# Patient Record
Sex: FEMALE | Race: WHITE | NOT HISPANIC OR LATINO
[De-identification: names, ages, dates, MRNs, and addresses within clinical notes are randomized per-mention and may not be internally consistent; named-entity substitution may affect disease eponyms.]

---

## 2019-02-25 ENCOUNTER — APPOINTMENT (OUTPATIENT)
Dept: INTERNAL MEDICINE | Facility: CLINIC | Age: 44
End: 2019-02-25
Payer: COMMERCIAL

## 2019-02-25 VITALS
SYSTOLIC BLOOD PRESSURE: 110 MMHG | HEART RATE: 62 BPM | TEMPERATURE: 98.2 F | HEIGHT: 66 IN | OXYGEN SATURATION: 100 % | WEIGHT: 132 LBS | BODY MASS INDEX: 21.21 KG/M2 | DIASTOLIC BLOOD PRESSURE: 60 MMHG

## 2019-02-25 DIAGNOSIS — Z83.79 FAMILY HISTORY OF OTHER DISEASES OF THE DIGESTIVE SYSTEM: ICD-10-CM

## 2019-02-25 DIAGNOSIS — Z80.0 FAMILY HISTORY OF MALIGNANT NEOPLASM OF DIGESTIVE ORGANS: ICD-10-CM

## 2019-02-25 DIAGNOSIS — Z83.3 FAMILY HISTORY OF DIABETES MELLITUS: ICD-10-CM

## 2019-02-25 DIAGNOSIS — F31.9 BIPOLAR DISORDER, UNSPECIFIED: ICD-10-CM

## 2019-02-25 DIAGNOSIS — Z80.1 FAMILY HISTORY OF MALIGNANT NEOPLASM OF TRACHEA, BRONCHUS AND LUNG: ICD-10-CM

## 2019-02-25 PROBLEM — Z00.00 ENCOUNTER FOR PREVENTIVE HEALTH EXAMINATION: Status: ACTIVE | Noted: 2019-02-25

## 2019-02-25 PROCEDURE — 99203 OFFICE O/P NEW LOW 30 MIN: CPT

## 2019-02-25 RX ORDER — OXCARBAZEPINE 600 MG/1
600 TABLET, FILM COATED ORAL TWICE DAILY
Refills: 0 | Status: ACTIVE | COMMUNITY

## 2019-02-25 NOTE — ASSESSMENT
[FreeTextEntry1] : I suspect the current symptoms are due to acid reflux disease. Will increase Prilosec to 20 mg b.i.d. Will refer to gastroenterology. Patient will need a repeat endoscopy.

## 2019-02-25 NOTE — HISTORY OF PRESENT ILLNESS
[FreeTextEntry1] : Burning substernal and upper epigastric pain [de-identified] : Patient complains of a burning substernal and upper and epigastric discomfort over the past 2 weeks. The symptoms have been fairly constant. No nausea or vomiting. She also has slight burning feeling in the throat region. Of note this patient was diagnosed with esophageal ulcer about 2 years ago. She had 2 endoscopies done at that time. She was placed on Nexium which she took for one year. She was doing well until the past 2 weeks when she has recurrent symptoms of a burning discomfort in the substernal and upper and epigastric region. She started taking Prilosec 20 mg daily over the past 10 days but does not feel better.

## 2019-03-08 ENCOUNTER — RECORD ABSTRACTING (OUTPATIENT)
Age: 44
End: 2019-03-08

## 2019-03-08 DIAGNOSIS — Z78.9 OTHER SPECIFIED HEALTH STATUS: ICD-10-CM

## 2019-03-08 DIAGNOSIS — Z87.891 PERSONAL HISTORY OF NICOTINE DEPENDENCE: ICD-10-CM

## 2019-03-10 DIAGNOSIS — F31.70 BIPOLAR DISORDER, CURRENTLY IN REMISSION, MOST RECENT EPISODE UNSPECIFIED: ICD-10-CM

## 2019-03-10 DIAGNOSIS — M50.90 CERVICAL DISC DISORDER, UNSPECIFIED, UNSPECIFIED CERVICAL REGION: ICD-10-CM

## 2019-03-10 NOTE — PHYSICAL EXAM
[General Appearance - Alert] : alert [General Appearance - In No Acute Distress] : in no acute distress [Sclera] : the sclera and conjunctiva were normal [PERRL With Normal Accommodation] : pupils were equal in size, round, and reactive to light [Extraocular Movements] : extraocular movements were intact [Auscultation Breath Sounds / Voice Sounds] : lungs were clear to auscultation bilaterally [Heart Rate And Rhythm] : heart rate was normal and rhythm regular [Heart Sounds] : normal S1 and S2 [Heart Sounds Gallop] : no gallops [Murmurs] : no murmurs [Heart Sounds Pericardial Friction Rub] : no pericardial rub [Edema] : there was no peripheral edema [Flat] : flat [Normal] : normal [Soft, Nontender] : the abdomen was soft and nontender [No Mass] : no masses were palpated [No HSM] : no hepatosplenomegaly noted [Abnormal Walk] : normal gait [Nail Clubbing] : no clubbing  or cyanosis of the fingernails [Musculoskeletal - Swelling] : no joint swelling seen [Skin Color & Pigmentation] : normal skin color and pigmentation [Skin Turgor] : normal skin turgor [] : no rash [Oriented To Time, Place, And Person] : oriented to person, place, and time [Impaired Insight] : insight and judgment were intact [Affect] : the affect was normal

## 2019-03-10 NOTE — ASSESSMENT
[FreeTextEntry1] : \par \par 1. GERD:  well  -  controlled,  no ht burn,  dysphagia,  throat clear\par * No LPR,  No Warren’s w No Dysplasia,  No  Esophagitis grade: A\par * Anti-reflux diet & life-style changes emphasized.  No  Bedge\par * Weight reduction & regular exercise emphasized\par * No  PPI:  ,  No  H2B q HS:  ,  No Carafate  1 gram:\par * No F/U  EGD:  [      ] mo.  /  [      ] yr\par * No  pH Monitor,  No  Manometry,  No  Esophagram\par * No  ENT  eval/F/U,  No  Surgical  eval\par \par \par \par \par 2. Hemorrhoids:  well – controlled.  No pain,  swelling,  itch,  bleeding\par * Discussed the potential complications of thrombosis,  pain,  infection,  swelling, itching,  bleeding \par * High – Fiber Diet was reviewed and emphasized\par * 6  --  8 cups of decaffeinated fluid daily\par * Sitz Bathes BID,  No:  Anusol HC  Suppos / Cream  ND BID\par * No:  Tucks BID,   No:  Balneol Lotion,   No:  Calmoseptine Oint,   ++ Prep H prn\par * No:  Colorectal surgical evaluation for possible ablation \par \par \par \par \par \par Informed Consent:\par * The risks & Benefits of EGD  /  Colonoscopy were discussed w patient.\par * This included but was not limited to perforation, bleeding, sedation /med rxns possibly requiring surgery, blood transfusions, antibiotics & CPR/Intubation.\par * Pt. understands & agrees to the procedures.\par * Pt. advised to D/C  ASA/NSAIDs  7  Days  \par * [ +++ ]  Dulcolax / Miralax / Mag. Citrate ,  [     ] Prepopik/ Clenpiq ,  [     ] Osmo Prep,  [    ] GoLytely,  prep. reviewed w Pt.\par * Hold  [           ] AM of procedure.\par * Hold  [           ] PM of procedure.\par * Take  [           ] PM of procedure.\par * Take  [           ] AM of procedure.\par \par \par \par

## 2019-03-10 NOTE — HISTORY OF PRESENT ILLNESS
[de-identified] : PCP: Chuckie\par \par 42 yo F w h/o  Bipolar D/O, Cervical Disc Dis\par GERD cx by esophageal ulcer,\par Hemorrhoids\par \par Today:\par \par * Abd pain—no\par * Nausea—no\par * Vomit—no\par * Belching—no\par * Regurgitation—no\par * Ht burn—no\par * Throat Clearing—no\par * Globus—no\par * Cough—no\par * Hoarseness—no\par * Dysphagia—no\par * BMs: # 4 qd\par * Constipation—no\par * Diarrhea—no\par * Bloating—no\par * Flatulence—no\par * Gurgling—no\par * Melena—no\par * BPBPR—no\par * Anorexia—no\par * Wt. Loss-no\par \par \par \par

## 2019-03-22 ENCOUNTER — APPOINTMENT (OUTPATIENT)
Dept: GASTROENTEROLOGY | Facility: CLINIC | Age: 44
End: 2019-03-22

## 2019-04-24 ENCOUNTER — APPOINTMENT (OUTPATIENT)
Dept: OBGYN | Facility: CLINIC | Age: 44
End: 2019-04-24
Payer: COMMERCIAL

## 2019-04-24 VITALS
DIASTOLIC BLOOD PRESSURE: 60 MMHG | SYSTOLIC BLOOD PRESSURE: 90 MMHG | HEIGHT: 66 IN | WEIGHT: 134 LBS | BODY MASS INDEX: 21.53 KG/M2

## 2019-04-24 PROCEDURE — 99386 PREV VISIT NEW AGE 40-64: CPT

## 2019-04-25 NOTE — PHYSICAL EXAM
[Awake] : awake [Alert] : alert [Acute Distress] : no acute distress [Mass] : no breast mass [Nipple Discharge] : no nipple discharge [Axillary LAD] : no axillary lymphadenopathy [Soft] : soft [Tender] : non tender [Oriented x3] : oriented to person, place, and time [Normal] : uterus [No Bleeding] : there was no active vaginal bleeding [Uterine Adnexae] : were not tender and not enlarged [de-identified] : no genital warts.

## 2019-06-19 ENCOUNTER — APPOINTMENT (OUTPATIENT)
Dept: INTERNAL MEDICINE | Facility: CLINIC | Age: 44
End: 2019-06-19
Payer: COMMERCIAL

## 2019-06-19 VITALS — HEIGHT: 66 IN | BODY MASS INDEX: 21.53 KG/M2 | TEMPERATURE: 98 F | WEIGHT: 134 LBS

## 2019-06-19 VITALS — DIASTOLIC BLOOD PRESSURE: 60 MMHG | SYSTOLIC BLOOD PRESSURE: 105 MMHG

## 2019-06-19 DIAGNOSIS — H92.01 OTALGIA, RIGHT EAR: ICD-10-CM

## 2019-06-19 PROCEDURE — 99214 OFFICE O/P EST MOD 30 MIN: CPT

## 2019-06-19 NOTE — HISTORY OF PRESENT ILLNESS
[FreeTextEntry1] : right ear pain and dizziness [de-identified] : Patient is a 43F who presents with pain in the right ear in the cold weather on and off. Also with episodes of dizziness on going from a laying down to sitting up position and leaning forward. These episodes are brief. \par Also with post nasal drip. \par Feels right ear feels different from the left. \par Hearing is not impacted

## 2019-06-19 NOTE — HEALTH RISK ASSESSMENT
[No falls in past year] : Patient reported no falls in the past year [0] : 2) Feeling down, depressed, or hopeless: Not at all (0) [] : No [YearQuit] : 1989 [de-identified] : rare [de-identified] : no [de-identified] : healthy [WLQ2Etcdo] : 0

## 2019-06-19 NOTE — REVIEW OF SYSTEMS
[Earache] : earache [Postnasal Drip] : postnasal drip [Negative] : Genitourinary [Hearing Loss] : no hearing loss [Nasal Discharge] : no nasal discharge [Sore Throat] : no sore throat

## 2019-06-19 NOTE — HISTORY OF PRESENT ILLNESS
[FreeTextEntry1] : right ear pain and dizziness [de-identified] : Patient is a 43F who presents with pain in the right ear in the cold weather on and off. Also with episodes of dizziness on going from a laying down to sitting up position and leaning forward. These episodes are brief. \par Also with post nasal drip. \par Feels right ear feels different from the left. \par Hearing is not impacted

## 2019-06-19 NOTE — HEALTH RISK ASSESSMENT
[No falls in past year] : Patient reported no falls in the past year [0] : 2) Feeling down, depressed, or hopeless: Not at all (0) [] : No [de-identified] : rare [YearQuit] : 1989 [de-identified] : no [de-identified] : healthy [[id number]] : 0

## 2019-06-19 NOTE — PHYSICAL EXAM
[No Acute Distress] : no acute distress [Well Nourished] : well nourished [Well Developed] : well developed [No JVD] : no jugular venous distention [Well-Appearing] : well-appearing [No Respiratory Distress] : no respiratory distress  [Clear to Auscultation] : lungs were clear to auscultation bilaterally [Normal Rate] : normal rate  [No Edema] : there was no peripheral edema [Regular Rhythm] : with a regular rhythm [Normal Posterior Cervical Nodes] : no posterior cervical lymphadenopathy [No Joint Swelling] : no joint swelling [Normal Anterior Cervical Nodes] : no anterior cervical lymphadenopathy [No Rash] : no rash [No Focal Deficits] : no focal deficits [Normal Insight/Judgement] : insight and judgment were intact [Normal Affect] : the affect was normal [de-identified] : left ear with impacted cerumen. Nasal turbinates swollen

## 2019-06-19 NOTE — PHYSICAL EXAM
[Well Nourished] : well nourished [No Acute Distress] : no acute distress [Well Developed] : well developed [Well-Appearing] : well-appearing [No JVD] : no jugular venous distention [No Respiratory Distress] : no respiratory distress  [Clear to Auscultation] : lungs were clear to auscultation bilaterally [Normal Rate] : normal rate  [No Edema] : there was no peripheral edema [Regular Rhythm] : with a regular rhythm [Normal Posterior Cervical Nodes] : no posterior cervical lymphadenopathy [Normal Anterior Cervical Nodes] : no anterior cervical lymphadenopathy [No Joint Swelling] : no joint swelling [No Rash] : no rash [No Focal Deficits] : no focal deficits [Normal Insight/Judgement] : insight and judgment were intact [Normal Affect] : the affect was normal [de-identified] : left ear with impacted cerumen. Nasal turbinates swollen

## 2019-06-26 ENCOUNTER — APPOINTMENT (OUTPATIENT)
Dept: INTERNAL MEDICINE | Facility: CLINIC | Age: 44
End: 2019-06-26
Payer: COMMERCIAL

## 2019-06-26 VITALS — SYSTOLIC BLOOD PRESSURE: 110 MMHG | DIASTOLIC BLOOD PRESSURE: 70 MMHG

## 2019-06-26 DIAGNOSIS — H61.22 IMPACTED CERUMEN, LEFT EAR: ICD-10-CM

## 2019-06-26 DIAGNOSIS — R42 DIZZINESS AND GIDDINESS: ICD-10-CM

## 2019-06-26 PROCEDURE — 99213 OFFICE O/P EST LOW 20 MIN: CPT | Mod: 25

## 2019-06-26 PROCEDURE — 69209 REMOVE IMPACTED EAR WAX UNI: CPT

## 2019-06-26 RX ORDER — METRONIDAZOLE 7.5 MG/G
0.75 CREAM TOPICAL
Qty: 45 | Refills: 0 | Status: COMPLETED | COMMUNITY
Start: 2018-09-26 | End: 2019-06-26

## 2019-06-26 RX ORDER — OXCARBAZEPINE 600 MG/1
600 TABLET, FILM COATED ORAL
Qty: 60 | Refills: 0 | Status: DISCONTINUED | COMMUNITY
Start: 2018-03-23 | End: 2019-06-26

## 2019-06-26 NOTE — PHYSICAL EXAM
[No Acute Distress] : no acute distress [Well Developed] : well developed [Well Nourished] : well nourished [Well-Appearing] : well-appearing [Normal Oropharynx] : the oropharynx was normal [Normal Nasal Mucosa] : the nasal mucosa was normal [Normal Outer Ear/Nose] : the outer ears and nose were normal in appearance [No Respiratory Distress] : no respiratory distress  [Normal Posterior Cervical Nodes] : no posterior cervical lymphadenopathy [No Edema] : there was no peripheral edema [Normal Gait] : normal gait [No Rash] : no rash [Normal Anterior Cervical Nodes] : no anterior cervical lymphadenopathy [No Focal Deficits] : no focal deficits [Coordination Grossly Intact] : coordination grossly intact [Normal Affect] : the affect was normal [Normal Insight/Judgement] : insight and judgment were intact [de-identified] : impacted left cerumen pre irrigation, post irrigation with normal TM and erythematous canal on left

## 2019-06-26 NOTE — REVIEW OF SYSTEMS
[Postnasal Drip] : postnasal drip [Earache] : earache [Headache] : no headache [Dizziness] : dizziness [Negative] : Heme/Lymph

## 2019-06-26 NOTE — HISTORY OF PRESENT ILLNESS
[de-identified] : Patient is a 43F who presents today in follow up for impacted cerumen. \par episodes of dizziness on going from a laying down to sitting up position and leaning forward have improved since starting debrox.\par Post nasal drip improving with flonase\par  [FreeTextEntry1] : follow up impacted ear wax in left ear

## 2019-08-06 ENCOUNTER — APPOINTMENT (OUTPATIENT)
Dept: INTERNAL MEDICINE | Facility: CLINIC | Age: 44
End: 2019-08-06
Payer: COMMERCIAL

## 2019-08-06 VITALS — BODY MASS INDEX: 21.69 KG/M2 | WEIGHT: 135 LBS | TEMPERATURE: 98 F | HEIGHT: 66 IN

## 2019-08-06 VITALS — DIASTOLIC BLOOD PRESSURE: 70 MMHG | SYSTOLIC BLOOD PRESSURE: 110 MMHG

## 2019-08-06 DIAGNOSIS — R19.7 DIARRHEA, UNSPECIFIED: ICD-10-CM

## 2019-08-06 PROCEDURE — 99214 OFFICE O/P EST MOD 30 MIN: CPT

## 2019-08-06 RX ORDER — CLINDAMYCIN PHOSPHATE 100 MG/1
100 SUPPOSITORY VAGINAL
Qty: 3 | Refills: 0 | Status: DISCONTINUED | COMMUNITY
Start: 2019-05-15

## 2019-08-06 RX ORDER — FLUCONAZOLE 150 MG/1
150 TABLET ORAL
Qty: 10 | Refills: 0 | Status: DISCONTINUED | COMMUNITY
Start: 2019-05-15

## 2019-08-06 NOTE — HISTORY OF PRESENT ILLNESS
[FreeTextEntry1] : diarrhea, abnormal thyroid tests,  [de-identified] : Patient is a 43F with a hx of GERD and ulcer follows with Dr. Tavera - has had bouts of diarrhea in the past of no known etiology. They usually resolve within a few days. Currently patient is having diarrhea for the last 11 days 3-4 times per day and increased after eating. Abdominal pain in the lower abdomen and RUQ that is relieved with defecation\par nausea and heartburn present, No fever\par no antibiotics recently, no recent travel\par \par Patient would like to follow up on low eosinophil level from back in April - reports Dr. Tavera was supposed to get back to her after peaking to hematology, has not been able to get in touch with Dr. Tavera\marcus Also was noted in april to have abnormal thyroid labs

## 2019-08-06 NOTE — HEALTH RISK ASSESSMENT
[No] : In the past 12 months have you used drugs other than those required for medical reasons? No [No falls in past year] : Patient reported no falls in the past year [0] : 2) Feeling down, depressed, or hopeless: Not at all (0) [] : No [de-identified] : well balanced  [de-identified] : 3 days week, walks/run [YearQuit] : 2000 [[id number]] : 0

## 2019-08-06 NOTE — REVIEW OF SYSTEMS
[Fatigue] : fatigue [Abdominal Pain] : abdominal pain [Nausea] : nausea [Diarrhea] : diarrhea [Heartburn] : heartburn [Negative] : Heme/Lymph [Fever] : no fever [Chills] : no chills [Vomiting] : no vomiting

## 2019-08-06 NOTE — PHYSICAL EXAM
[Normal Sclera/Conjunctiva] : normal sclera/conjunctiva [Normal Outer Ear/Nose] : the outer ears and nose were normal in appearance [No Edema] : there was no peripheral edema [Normal] : affect was normal and insight and judgment were intact

## 2019-08-09 ENCOUNTER — RX RENEWAL (OUTPATIENT)
Age: 44
End: 2019-08-09

## 2019-08-15 ENCOUNTER — RX RENEWAL (OUTPATIENT)
Age: 44
End: 2019-08-15

## 2019-09-16 ENCOUNTER — APPOINTMENT (OUTPATIENT)
Dept: INTERNAL MEDICINE | Facility: CLINIC | Age: 44
End: 2019-09-16
Payer: COMMERCIAL

## 2019-09-16 VITALS — DIASTOLIC BLOOD PRESSURE: 70 MMHG | SYSTOLIC BLOOD PRESSURE: 100 MMHG

## 2019-09-16 VITALS — BODY MASS INDEX: 21.69 KG/M2 | WEIGHT: 135 LBS | HEIGHT: 66 IN | TEMPERATURE: 98 F

## 2019-09-16 DIAGNOSIS — D72.818 OTHER DECREASED WHITE BLOOD CELL COUNT: ICD-10-CM

## 2019-09-16 DIAGNOSIS — A04.72 ENTEROCOLITIS DUE TO CLOSTRIDIUM DIFFICILE, NOT SPECIFIED AS RECURRENT: ICD-10-CM

## 2019-09-16 DIAGNOSIS — R94.6 ABNORMAL RESULTS OF THYROID FUNCTION STUDIES: ICD-10-CM

## 2019-09-16 DIAGNOSIS — R09.81 NASAL CONGESTION: ICD-10-CM

## 2019-09-16 PROCEDURE — 99214 OFFICE O/P EST MOD 30 MIN: CPT

## 2019-09-16 NOTE — REVIEW OF SYSTEMS
[Heartburn] : heartburn [Negative] : Heme/Lymph [Diarrhea] : diarrhea [Abdominal Pain] : no abdominal pain

## 2019-09-16 NOTE — PHYSICAL EXAM
[Normal Sclera/Conjunctiva] : normal sclera/conjunctiva [Normal Outer Ear/Nose] : the outer ears and nose were normal in appearance [No Edema] : there was no peripheral edema [No Rash] : no rash [Normal] : affect was normal and insight and judgment were intact [de-identified] : swollen nasal turbinates

## 2019-09-16 NOTE — HISTORY OF PRESENT ILLNESS
[FreeTextEntry1] : follow up [de-identified] : Patient is a 43F with a hx of GERD and ulcer follows with Dr. Tavera \par Cramping is resolved. One bout of diarrhea in the last 2 weeks. Completed treatment for Cdiff\par Had labs draw as well and would like to review - hx of abnormal thyroid and low eosinophil\par Persistent GERD, would like to review alternative options\par

## 2019-09-16 NOTE — HEALTH RISK ASSESSMENT
[Never (0 pts)] : Never (0 points) [No] : In the past 12 months have you used drugs other than those required for medical reasons? No [No falls in past year] : Patient reported no falls in the past year [0] : 1) Little interest or pleasure doing things: Not at all (0) [] : No [YearQuit] : 2000 [de-identified] : walks, run 3days week [de-identified] : well balanced  [[id number]] : 0

## 2019-10-07 ENCOUNTER — APPOINTMENT (OUTPATIENT)
Dept: INTERNAL MEDICINE | Facility: CLINIC | Age: 44
End: 2019-10-07
Payer: COMMERCIAL

## 2019-10-07 VITALS
DIASTOLIC BLOOD PRESSURE: 64 MMHG | HEIGHT: 66 IN | BODY MASS INDEX: 21.69 KG/M2 | SYSTOLIC BLOOD PRESSURE: 102 MMHG | WEIGHT: 135 LBS | HEART RATE: 68 BPM

## 2019-10-07 DIAGNOSIS — K20.9 ESOPHAGITIS, UNSPECIFIED: ICD-10-CM

## 2019-10-07 PROCEDURE — 99213 OFFICE O/P EST LOW 20 MIN: CPT

## 2019-10-07 NOTE — PHYSICAL EXAM
[Normal Sclera/Conjunctiva] : normal sclera/conjunctiva [Normal Outer Ear/Nose] : the outer ears and nose were normal in appearance [Soft] : abdomen soft [Non Tender] : non-tender [Non-distended] : non-distended [No Masses] : no abdominal mass palpated [Normal Bowel Sounds] : normal bowel sounds [Normal] : affect was normal and insight and judgment were intact

## 2019-10-07 NOTE — HISTORY OF PRESENT ILLNESS
[FreeTextEntry8] : Patient is a 44F with a hx of GERD and esophagitis with ulcer presents today with reoccurring symptoms\par Last EGD was within the last 6 months showing gastritis but healed ulcer. \par Has been taking prilosec BID and liquid antacid without relief. Diet is very bland with mostly whole grains, vegetables and very little meat. No caffeine or spices.

## 2019-10-30 ENCOUNTER — RX CHANGE (OUTPATIENT)
Age: 44
End: 2019-10-30

## 2019-10-30 RX ORDER — PANTOPRAZOLE 40 MG/1
40 TABLET, DELAYED RELEASE ORAL
Qty: 60 | Refills: 0 | Status: DISCONTINUED | COMMUNITY
Start: 2019-10-07 | End: 2019-10-30

## 2019-10-31 ENCOUNTER — APPOINTMENT (OUTPATIENT)
Dept: GASTROENTEROLOGY | Facility: CLINIC | Age: 44
End: 2019-10-31
Payer: COMMERCIAL

## 2019-10-31 VITALS
HEART RATE: 71 BPM | BODY MASS INDEX: 20.89 KG/M2 | HEIGHT: 66 IN | SYSTOLIC BLOOD PRESSURE: 100 MMHG | DIASTOLIC BLOOD PRESSURE: 64 MMHG | WEIGHT: 130 LBS

## 2019-10-31 PROCEDURE — 99204 OFFICE O/P NEW MOD 45 MIN: CPT

## 2019-10-31 RX ORDER — VANCOMYCIN HYDROCHLORIDE 125 MG/1
125 CAPSULE ORAL
Qty: 12 | Refills: 0 | Status: DISCONTINUED | COMMUNITY
Start: 2019-08-09 | End: 2019-10-31

## 2019-10-31 RX ORDER — CYCLOBENZAPRINE HYDROCHLORIDE 5 MG/1
5 TABLET, FILM COATED ORAL
Refills: 0 | Status: DISCONTINUED | COMMUNITY
End: 2019-10-31

## 2019-10-31 RX ORDER — MELOXICAM 15 MG/1
15 TABLET ORAL
Refills: 0 | Status: DISCONTINUED | COMMUNITY
End: 2019-10-31

## 2019-10-31 RX ORDER — CIPROFLOXACIN AND DEXAMETHASONE 3; 1 MG/ML; MG/ML
0.3-0.1 SUSPENSION/ DROPS AURICULAR (OTIC) TWICE DAILY
Qty: 6 | Refills: 0 | Status: DISCONTINUED | COMMUNITY
Start: 2019-06-26 | End: 2019-10-31

## 2019-10-31 RX ORDER — FAMOTIDINE 40 MG/1
40 TABLET, FILM COATED ORAL TWICE DAILY
Qty: 180 | Refills: 2 | Status: ACTIVE | COMMUNITY
Start: 2019-10-31 | End: 1900-01-01

## 2019-10-31 NOTE — ASSESSMENT
[FreeTextEntry1] : Will stop her PPI medication as it is unclear if this has provided her any benefit.\par \par Will trial pepcid 40mg PO BID.\par \par Will obtain an X-ray esophagram to rule out a hiatal hernia or other anatomic defect/abnormality.\par \par Will very likely need pH and impedance testing to rule out acid vs. non-acid reflux.  May need eventual manometry testing.\par \par Would consider referring for surgical consultation to discuss anti-reflux surgeries.

## 2019-10-31 NOTE — HISTORY OF PRESENT ILLNESS
[FreeTextEntry1] : Ms. De Leon is a pleasant 44F h/o bipolar d/o, esophagitis/esophageal ulcer, C. diff who is seen at the request of Dr. Noguera for evaluation of GERD.\par \par She had mild heartburn previously, initially had an EGD 2 years ago in [place] showing a distal esophageal ulcer.  She was placed on a daily PPI at that time with confirmed healing of her ulcer on a repeat EGD 4/19.\par \par She has had difficult to treat symptoms, with persistent burning that rises from her epigastrum to her upper throat.  She has tried Nexium, Prilosec, and Protonix, up to twice a day with mixed results, mild prior improvement. She developed C. diff associated diarrhea, suspected as a result of her PPI use.\par \par She eats a mainly plant-based diet.  She is not overweight.\par \par She has identified certain trigger foods such as chocolate and coffee.\par \par She does not experience pain or difficulty swallowing.  No weight loss.\par \par She has symptoms nearly constantly now.  She was recently prescribed protonix which she has been taking intermittently, she is concerned regarding long term use of PPI medications due to potential risks.\par \par She has tried Pepcid PRN on occasion without much improvement.\par \par She was trialed on carafate without much improvement.\par \par She has not tried alkaline water or gaviscon.\par \par She has not had any imaging or pH/manometry/impedance studies.

## 2019-11-16 ENCOUNTER — RESULT REVIEW (OUTPATIENT)
Age: 44
End: 2019-11-16

## 2020-04-02 ENCOUNTER — APPOINTMENT (OUTPATIENT)
Dept: INTERNAL MEDICINE | Facility: CLINIC | Age: 45
End: 2020-04-02
Payer: COMMERCIAL

## 2020-04-02 DIAGNOSIS — R68.89 OTHER GENERAL SYMPTOMS AND SIGNS: ICD-10-CM

## 2020-04-02 PROCEDURE — G2012 BRIEF CHECK IN BY MD/QHP: CPT

## 2020-07-21 ENCOUNTER — APPOINTMENT (OUTPATIENT)
Dept: OBGYN | Facility: CLINIC | Age: 45
End: 2020-07-21
Payer: COMMERCIAL

## 2020-07-21 VITALS
TEMPERATURE: 98.3 F | BODY MASS INDEX: 21.38 KG/M2 | HEART RATE: 67 BPM | DIASTOLIC BLOOD PRESSURE: 60 MMHG | WEIGHT: 133 LBS | OXYGEN SATURATION: 99 % | HEIGHT: 66 IN | SYSTOLIC BLOOD PRESSURE: 100 MMHG

## 2020-07-21 DIAGNOSIS — Z12.31 ENCOUNTER FOR SCREENING MAMMOGRAM FOR MALIGNANT NEOPLASM OF BREAST: ICD-10-CM

## 2020-07-21 DIAGNOSIS — Z01.419 ENCOUNTER FOR GYNECOLOGICAL EXAMINATION (GENERAL) (ROUTINE) W/OUT ABNORMAL FINDINGS: ICD-10-CM

## 2020-07-21 DIAGNOSIS — R10.2 PELVIC AND PERINEAL PAIN: ICD-10-CM

## 2020-07-21 LAB
BILIRUB UR QL STRIP: NORMAL
CLARITY UR: CLEAR
COLLECTION METHOD: NORMAL
GLUCOSE UR-MCNC: NORMAL
HCG UR QL: 0.2 EU/DL
HGB UR QL STRIP.AUTO: NORMAL
KETONES UR-MCNC: NORMAL
LEUKOCYTE ESTERASE UR QL STRIP: NORMAL
NITRITE UR QL STRIP: NORMAL
PH UR STRIP: 5
PROT UR STRIP-MCNC: NORMAL
SP GR UR STRIP: .=103

## 2020-07-21 PROCEDURE — 81002 URINALYSIS NONAUTO W/O SCOPE: CPT

## 2020-07-21 PROCEDURE — 99213 OFFICE O/P EST LOW 20 MIN: CPT

## 2020-07-21 RX ORDER — SODIUM FLUORIDE 6 MG/ML
1.1 PASTE, DENTIFRICE DENTAL
Qty: 100 | Refills: 0 | Status: DISCONTINUED | COMMUNITY
Start: 2018-12-22 | End: 2020-07-21

## 2020-07-21 NOTE — REVIEW OF SYSTEMS
[Hesitancy] : urinary hesitancy [Nl] : Integumentary [Incomplete Emptying] : incomplete emptying of bladder [Dysuria] : no dysuria [Pelvic Pain] : no pelvic pain [Frequency] : no frequency [Vaginal Pain] : no vaginal pain [Vaginal Discharge] : no vaginal discharge [Vaginal Odor] : no vaginal odor [FreeTextEntry3] : small urine stream

## 2020-07-21 NOTE — PHYSICAL EXAM
[No Lesions] : no genitalia lesions [Normal] : uterus [Pink Rugae] : pink rugae [No Bleeding] : there was no active vaginal bleeding [Uterine Adnexae] : were not tender and not enlarged [Erythema] : no erythema [Cystocele] : no cystocele [Discharge] : no discharge [Motion Tenderness] : there was no cervical motion tenderness

## 2020-08-07 ENCOUNTER — APPOINTMENT (OUTPATIENT)
Dept: OBGYN | Facility: CLINIC | Age: 45
End: 2020-08-07
Payer: COMMERCIAL

## 2020-08-07 VITALS
BODY MASS INDEX: 21.69 KG/M2 | WEIGHT: 135 LBS | SYSTOLIC BLOOD PRESSURE: 104 MMHG | HEIGHT: 66 IN | DIASTOLIC BLOOD PRESSURE: 62 MMHG

## 2020-08-07 DIAGNOSIS — Z12.31 ENCOUNTER FOR SCREENING MAMMOGRAM FOR MALIGNANT NEOPLASM OF BREAST: ICD-10-CM

## 2020-08-07 DIAGNOSIS — Z11.51 ENCOUNTER FOR SCREENING FOR HUMAN PAPILLOMAVIRUS (HPV): ICD-10-CM

## 2020-08-07 DIAGNOSIS — R92.2 INCONCLUSIVE MAMMOGRAM: ICD-10-CM

## 2020-08-07 DIAGNOSIS — M75.00 ADHESIVE CAPSULITIS OF UNSPECIFIED SHOULDER: ICD-10-CM

## 2020-08-07 PROCEDURE — 99396 PREV VISIT EST AGE 40-64: CPT

## 2020-08-07 NOTE — COUNSELING
[Vitamins/Supplements] : vitamins/supplements [Breast Self Exam] : breast self exam [Exercise] : exercise [Menstrual Calendar] : menstrual calendar [STD (testing, results, tx)] : STD (testing, results, tx)

## 2020-08-07 NOTE — DISCUSSION/SUMMARY
[FreeTextEntry1] : Discussed exam findings with pt c/w a posterior fibroid. Pelvic US referral given. Urinary retention less likely to be associated with a posterior fibroid. Ucx sent to r/o UTI. Urology referral given if Ucx returns negative. Counseled that pap may repeated q5y from negative co-testing, but pt would like pap today, which was performed and sent to designated [facility]. [facility]/US referral given for annual breast cancer screening. RTC 1 year for GYN annual.

## 2020-08-07 NOTE — PHYSICAL EXAM
[Awake] : awake [LAD] : no lymphadenopathy [Alert] : alert [Acute Distress] : no acute distress [Thyroid Nodule] : no thyroid nodule [Mass] : no breast mass [Goiter] : no goiter [Axillary LAD] : no axillary lymphadenopathy [Soft] : soft [Nipple Discharge] : no nipple discharge [Oriented x3] : oriented to person, place, and time [No Lesions] : no genitalia lesions [Tender] : non tender [Normal] : uterus [Labia Majora] : labia major [Labia Minora] : labia minora [Pap Obtained] : a Pap smear was performed [Discharge] : had no discharge [No Bleeding] : there was no active vaginal bleeding [Retroversion] : retroverted [Motion Tenderness] : there was no cervical motion tenderness [Enlarged ___ wks] : enlarged [unfilled] ~Uweeks [Mass ___ cm] : a [unfilled] ~Ucm uterine mass was palpated [Tenderness] : tender [Uterine Adnexae] : were not tender and not enlarged [Adnexa Tenderness] : were not tender [Ovarian Mass (___ Cm)] : there were no adnexal masses

## 2020-09-30 ENCOUNTER — ASOB RESULT (OUTPATIENT)
Age: 45
End: 2020-09-30

## 2020-09-30 ENCOUNTER — APPOINTMENT (OUTPATIENT)
Dept: OBGYN | Facility: CLINIC | Age: 45
End: 2020-09-30
Payer: COMMERCIAL

## 2020-09-30 PROCEDURE — 76830 TRANSVAGINAL US NON-OB: CPT

## 2021-06-30 ENCOUNTER — APPOINTMENT (OUTPATIENT)
Dept: INTERNAL MEDICINE | Facility: CLINIC | Age: 46
End: 2021-06-30
Payer: COMMERCIAL

## 2021-06-30 VITALS — SYSTOLIC BLOOD PRESSURE: 100 MMHG | DIASTOLIC BLOOD PRESSURE: 60 MMHG

## 2021-06-30 VITALS — BODY MASS INDEX: 21.21 KG/M2 | HEIGHT: 66 IN | WEIGHT: 132 LBS | TEMPERATURE: 98 F

## 2021-06-30 DIAGNOSIS — R33.9 RETENTION OF URINE, UNSPECIFIED: ICD-10-CM

## 2021-06-30 DIAGNOSIS — K64.4 RESIDUAL HEMORRHOIDAL SKIN TAGS: ICD-10-CM

## 2021-06-30 DIAGNOSIS — R39.198 OTHER DIFFICULTIES WITH MICTURITION: ICD-10-CM

## 2021-06-30 LAB
BILIRUB UR QL STRIP: NEGATIVE
CLARITY UR: CLEAR
COLLECTION METHOD: NORMAL
GLUCOSE UR-MCNC: NEGATIVE
HCG UR QL: 0.2 EU/DL
HGB UR QL STRIP.AUTO: NEGATIVE
KETONES UR-MCNC: NEGATIVE
LEUKOCYTE ESTERASE UR QL STRIP: NEGATIVE
NITRITE UR QL STRIP: NEGATIVE
PH UR STRIP: 7
PROT UR STRIP-MCNC: NORMAL
SP GR UR STRIP: 1.02

## 2021-06-30 PROCEDURE — 99214 OFFICE O/P EST MOD 30 MIN: CPT | Mod: 25

## 2021-06-30 PROCEDURE — 81003 URINALYSIS AUTO W/O SCOPE: CPT | Mod: QW

## 2021-06-30 PROCEDURE — 99072 ADDL SUPL MATRL&STAF TM PHE: CPT

## 2021-06-30 NOTE — PHYSICAL EXAM
[Normal Sclera/Conjunctiva] : normal sclera/conjunctiva [Normal Outer Ear/Nose] : the outer ears and nose were normal in appearance [Normal] : normal rate, regular rhythm, normal S1 and S2 and no murmur heard [No Edema] : there was no peripheral edema [de-identified] : pain on palpation of lower abdomen

## 2021-06-30 NOTE — HISTORY OF PRESENT ILLNESS
[FreeTextEntry8] : Patient is a 45F who presents today with difficulty urinating\par Has been going on for several months. Does not happen all the time. Mostly in the am with pelvic pressure and pain with subsequent difficulty urinating.\par Seen by GYN 10/2020 who performed US and found several fibroids that could possibly be causing the issue, but advised patient to follow up with urology to rule out  abnormality. Patient moved to [place] after this and did not follow up \par She is in [place] until friday and going back to [place] until august

## 2021-06-30 NOTE — HEALTH RISK ASSESSMENT
[Never (0 pts)] : Never (0 points) [No falls in past year] : Patient reported no falls in the past year [0] : 2) Feeling down, depressed, or hopeless: Not at all (0) [No] : No [] : No [YearQuit] : 2000 [de-identified] : walks, run 3days week [de-identified] : well balanced  [[id number]] : 0

## 2021-07-01 ENCOUNTER — RESULT REVIEW (OUTPATIENT)
Age: 46
End: 2021-07-01

## 2021-08-02 ENCOUNTER — APPOINTMENT (OUTPATIENT)
Dept: UROLOGY | Facility: CLINIC | Age: 46
End: 2021-08-02

## 2021-08-11 ENCOUNTER — APPOINTMENT (OUTPATIENT)
Dept: OBGYN | Facility: CLINIC | Age: 46
End: 2021-08-11
Payer: COMMERCIAL

## 2021-08-11 VITALS
SYSTOLIC BLOOD PRESSURE: 102 MMHG | HEIGHT: 66 IN | DIASTOLIC BLOOD PRESSURE: 62 MMHG | BODY MASS INDEX: 21.86 KG/M2 | WEIGHT: 136 LBS

## 2021-08-11 DIAGNOSIS — Z01.419 ENCOUNTER FOR GYNECOLOGICAL EXAMINATION (GENERAL) (ROUTINE) W/OUT ABNORMAL FINDINGS: ICD-10-CM

## 2021-08-11 DIAGNOSIS — D21.9 BENIGN NEOPLASM OF CONNECTIVE AND OTHER SOFT TISSUE, UNSPECIFIED: ICD-10-CM

## 2021-08-11 PROCEDURE — 99396 PREV VISIT EST AGE 40-64: CPT

## 2021-08-12 NOTE — HISTORY OF PRESENT ILLNESS
[TextBox_4] : 45yo P0 here for annual gyn and f/u of fibroid uterus.  Seen by CC for annual last year.\par H/o large fibroids and voiding difficulty, now worse for the last 3months.  She wakes at night and cannot urinate/ feels urge and needs to walk to up to a mile before she can urinate (at 3am!).\par She feels she wants intervention for the fibroids.  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 Grew up in [place] and the [place] and speaks 3 languages fluently. \par

## 2021-08-12 NOTE — PLAN
[FreeTextEntry1] : Discussed options for mgmt including hysterectomy vs Uterine artery embolization.  Pt will consider/ and have a consult with IR but likely will need management.  Also, unlikely to be a candidate for minimally invasive hysterectomy given bulky nature- takes up almost entire pelvis.  Reveiwed results of sono / compared to last year which was similar.

## 2021-08-12 NOTE — PHYSICAL EXAM
[Appropriately responsive] : appropriately responsive [Alert] : alert [No Acute Distress] : no acute distress [No Lymphadenopathy] : no lymphadenopathy [Soft] : soft [Non-tender] : non-tender [Non-distended] : non-distended [No HSM] : No HSM [No Lesions] : no lesions [No Mass] : no mass [Oriented x3] : oriented x3 [Examination Of The Breasts] : a normal appearance [No Masses] : no breast masses were palpable [Labia Majora] : normal [Labia Minora] : normal [Normal] : normal [Uterine Adnexae] : normal [FreeTextEntry6] : Enlarged bulky uterus taking up entire pelvis, especially posterior cul de sac; mildly tender to palpation

## 2021-08-20 ENCOUNTER — APPOINTMENT (OUTPATIENT)
Dept: UROLOGY | Facility: CLINIC | Age: 46
End: 2021-08-20

## 2021-08-24 ENCOUNTER — RESULT REVIEW (OUTPATIENT)
Age: 46
End: 2021-08-24

## 2021-09-14 ENCOUNTER — RESULT REVIEW (OUTPATIENT)
Age: 46
End: 2021-09-14

## 2021-09-16 ENCOUNTER — RESULT REVIEW (OUTPATIENT)
Age: 46
End: 2021-09-16

## 2021-09-17 ENCOUNTER — RESULT REVIEW (OUTPATIENT)
Age: 46
End: 2021-09-17

## 2021-09-24 ENCOUNTER — APPOINTMENT (OUTPATIENT)
Dept: FAMILY MEDICINE | Facility: CLINIC | Age: 46
End: 2021-09-24
Payer: COMMERCIAL

## 2021-09-24 VITALS
SYSTOLIC BLOOD PRESSURE: 100 MMHG | WEIGHT: 134 LBS | HEIGHT: 66 IN | DIASTOLIC BLOOD PRESSURE: 60 MMHG | BODY MASS INDEX: 21.53 KG/M2

## 2021-09-24 DIAGNOSIS — K29.00 ACUTE GASTRITIS W/OUT BLEEDING: ICD-10-CM

## 2021-09-24 DIAGNOSIS — Z09 ENCOUNTER FOR FOLLOW-UP EXAMINATION AFTER COMPLETED TREATMENT FOR CONDITIONS OTHER THAN MALIGNANT NEOPLASM: ICD-10-CM

## 2021-09-24 PROCEDURE — 99213 OFFICE O/P EST LOW 20 MIN: CPT

## 2021-09-24 RX ORDER — CYCLOBENZAPRINE HYDROCHLORIDE 5 MG/1
5 TABLET, FILM COATED ORAL DAILY
Qty: 10 | Refills: 0 | Status: DISCONTINUED | COMMUNITY
Start: 2021-06-30 | End: 2021-09-24

## 2021-09-24 RX ORDER — PANTOPRAZOLE 40 MG/1
40 TABLET, DELAYED RELEASE ORAL
Qty: 180 | Refills: 1 | Status: DISCONTINUED | COMMUNITY
Start: 2019-10-30 | End: 2021-09-24

## 2021-09-24 RX ORDER — OMEPRAZOLE 20 MG/1
20 CAPSULE, DELAYED RELEASE ORAL TWICE DAILY
Qty: 180 | Refills: 3 | Status: DISCONTINUED | COMMUNITY
Start: 2019-02-25 | End: 2021-09-24

## 2021-09-24 RX ORDER — SUCRALFATE 1 G/10ML
1 SUSPENSION ORAL 4 TIMES DAILY
Qty: 400 | Refills: 0 | Status: ACTIVE | COMMUNITY
Start: 2021-09-24 | End: 1900-01-01

## 2021-09-24 NOTE — HISTORY OF PRESENT ILLNESS
[Post-hospitalization from ___ Hospital] : Post-hospitalization from [unfilled] Hospital [FreeTextEntry2] : Admitted for uterine artery embolization for a larger uterine fibroid. Sent home on NSAIDs and oxycodone. Has a hx of gastritis with ulcer. NSAID use has flared her gastritis and reflux. Has had pain and nausea.

## 2021-09-24 NOTE — PHYSICAL EXAM
[Normal] : normal rate, regular rhythm, normal S1 and S2 and no murmur heard [de-identified] : epigastric pain

## 2021-09-24 NOTE — HEALTH RISK ASSESSMENT
[No] : In the past 12 months have you used drugs other than those required for medical reasons? No [No falls in past year] : Patient reported no falls in the past year [0] : 2) Feeling down, depressed, or hopeless: Not at all (0) [PHQ-2 Negative - No further assessment needed] : PHQ-2 Negative - No further assessment needed [] : No [de-identified] : walking [de-identified] : normal [[id number]] : 0

## 2021-09-29 ENCOUNTER — RESULT REVIEW (OUTPATIENT)
Age: 46
End: 2021-09-29

## 2021-09-30 ENCOUNTER — APPOINTMENT (OUTPATIENT)
Dept: GASTROENTEROLOGY | Facility: CLINIC | Age: 46
End: 2021-09-30
Payer: COMMERCIAL

## 2021-09-30 VITALS
TEMPERATURE: 97.3 F | WEIGHT: 132 LBS | DIASTOLIC BLOOD PRESSURE: 64 MMHG | HEART RATE: 71 BPM | HEIGHT: 66 IN | SYSTOLIC BLOOD PRESSURE: 108 MMHG | BODY MASS INDEX: 21.21 KG/M2 | OXYGEN SATURATION: 99 %

## 2021-09-30 DIAGNOSIS — Z12.11 ENCOUNTER FOR SCREENING FOR MALIGNANT NEOPLASM OF COLON: ICD-10-CM

## 2021-09-30 PROCEDURE — 99214 OFFICE O/P EST MOD 30 MIN: CPT

## 2021-09-30 RX ORDER — AZELAIC ACID 0.15 G/G
15 GEL TOPICAL
Qty: 50 | Refills: 0 | Status: DISCONTINUED | COMMUNITY
Start: 2018-09-26 | End: 2021-09-30

## 2021-09-30 RX ORDER — HYDROCORTISONE ACETATE 25 MG/1
25 SUPPOSITORY RECTAL
Qty: 30 | Refills: 0 | Status: DISCONTINUED | COMMUNITY
Start: 2021-06-30 | End: 2021-09-30

## 2021-09-30 RX ORDER — HYDROCORTISONE 25 MG/G
2.5 CREAM TOPICAL
Qty: 1 | Refills: 5 | Status: DISCONTINUED | COMMUNITY
Start: 2021-06-30 | End: 2021-09-30

## 2021-09-30 RX ORDER — SUCRALFATE 1 G/10ML
1 SUSPENSION ORAL 4 TIMES DAILY
Qty: 1200 | Refills: 0 | Status: DISCONTINUED | COMMUNITY
Start: 2019-10-07 | End: 2021-09-30

## 2021-09-30 NOTE — ASSESSMENT
[FreeTextEntry1] : Continue on pantoprazole 40mg PO qAM, pepcif 40mg PO qHS.\par \par Gaviscon PRN.\par \par Can use tylenol, percocet for pain control as directed by her PCP, Dr. Ordonez.\par \par Avoid NSAIDs if possible.\par \par Will plan on an upper endoscopy for GERD.  Explained risks/benefits/alternatives including not limited to bleeding, infection, perforation, missed lesions, anesthesia complications.  Patient understands and agrees, all questions answered.\par \par Will plan on a colonoscopy for screening.  Explained risks/benefits/alternatives including not limited to bleeding, infection, perforation, missed lesions, anesthesia complications.  Patient understands and agrees, all questions answered.  Will use a split dose miralax/gatorade prep with clears the day prior.\par

## 2021-09-30 NOTE — HISTORY OF PRESENT ILLNESS
[FreeTextEntry1] : Ms. De Leon is a pleasant 46F h/o bipolar d/o, esophagitis/esophageal ulcer, C. diff who returns for f/u.\par dixon Recently underwent a uterine artery embolization 9/16/21, she was prescribed motrin thereafter for pain control which led to significant exacerbation of her gastritis/esophagitis.  She has been experiencing severe burning pain in her epigastrum and esophagus.\par \par She was in the [facility] ED on 9/25/21, on that date:\par GB U/S: layering sludge in the GB\par Normal CBC\par CMET normal other than sodium 121 (has since increased to 126)\par \marcus Was prescribed pantoprazole, pepcid, carafate at that time with good symptom control.  Still with pain.\par \par Is concerned that she could have developed a recurrent esophageal ulcer.\par \marcus Had prior EGDs, most recently 3-4 years ago in [place] showing esophagitis, prior to that had an esophageal ulcer.\par \par No rectal bleeding or black stool.\par \marcus Has tried Nexium and Prilosec in the past, developed C. diff while on one of these medications.\par \par Does not smoke or drink.\par \par No FHx of any GI malignancies.

## 2021-10-01 ENCOUNTER — APPOINTMENT (OUTPATIENT)
Dept: FAMILY MEDICINE | Facility: CLINIC | Age: 46
End: 2021-10-01
Payer: COMMERCIAL

## 2021-10-01 VITALS
WEIGHT: 132 LBS | SYSTOLIC BLOOD PRESSURE: 102 MMHG | DIASTOLIC BLOOD PRESSURE: 60 MMHG | BODY MASS INDEX: 21.21 KG/M2 | HEART RATE: 70 BPM | HEIGHT: 66 IN

## 2021-10-01 DIAGNOSIS — E87.1 HYPO-OSMOLALITY AND HYPONATREMIA: ICD-10-CM

## 2021-10-01 DIAGNOSIS — Z76.0 ENCOUNTER FOR ISSUE OF REPEAT PRESCRIPTION: ICD-10-CM

## 2021-10-01 DIAGNOSIS — K21.9 GASTRO-ESOPHAGEAL REFLUX DISEASE W/OUT ESOPHAGITIS: ICD-10-CM

## 2021-10-01 PROCEDURE — 36415 COLL VENOUS BLD VENIPUNCTURE: CPT

## 2021-10-01 PROCEDURE — 99212 OFFICE O/P EST SF 10 MIN: CPT | Mod: 25

## 2021-10-01 RX ORDER — FAMOTIDINE 40 MG/1
40 TABLET, FILM COATED ORAL DAILY
Qty: 30 | Refills: 1 | Status: DISCONTINUED | COMMUNITY
Start: 2021-09-24 | End: 2021-10-01

## 2021-10-01 NOTE — HEALTH RISK ASSESSMENT
[No] : In the past 12 months have you used drugs other than those required for medical reasons? No [No falls in past year] : Patient reported no falls in the past year [0] : 2) Feeling down, depressed, or hopeless: Not at all (0) [PHQ-2 Negative - No further assessment needed] : PHQ-2 Negative - No further assessment needed [] : No [de-identified] : walking [de-identified] : normal [[id number]] : 0

## 2021-10-01 NOTE — HISTORY OF PRESENT ILLNESS
[FreeTextEntry1] : Refill for pantoprazole. \par Requesting slip for bloodwork. [de-identified] : Ms. KEVIN LANE is a 46 year old female here today for refill of pantoprazole. Saw GI yesterday- will have f/u ENDO and colon in the next month. Feeling alittle better GI-wise.\par

## 2021-12-09 ENCOUNTER — RESULT REVIEW (OUTPATIENT)
Age: 46
End: 2021-12-09

## 2021-12-10 ENCOUNTER — RESULT REVIEW (OUTPATIENT)
Age: 46
End: 2021-12-10

## 2021-12-10 ENCOUNTER — APPOINTMENT (OUTPATIENT)
Dept: GASTROENTEROLOGY | Facility: HOSPITAL | Age: 46
End: 2021-12-10

## 2021-12-15 RX ORDER — PANTOPRAZOLE 40 MG/1
40 TABLET, DELAYED RELEASE ORAL DAILY
Qty: 90 | Refills: 0 | Status: ACTIVE | COMMUNITY
Start: 1900-01-01 | End: 1900-01-01

## 2022-01-04 ENCOUNTER — RX RENEWAL (OUTPATIENT)
Age: 47
End: 2022-01-04

## 2022-02-25 ENCOUNTER — RESULT REVIEW (OUTPATIENT)
Age: 47
End: 2022-02-25

## 2022-03-25 LAB
CYTOLOGY CVX/VAG DOC THIN PREP: NORMAL
HPV HIGH+LOW RISK DNA PNL CVX: NOT DETECTED